# Patient Record
Sex: FEMALE | Race: WHITE
[De-identification: names, ages, dates, MRNs, and addresses within clinical notes are randomized per-mention and may not be internally consistent; named-entity substitution may affect disease eponyms.]

---

## 2018-01-17 ENCOUNTER — HOSPITAL ENCOUNTER (OUTPATIENT)
Dept: HOSPITAL 45 - C.RAD | Age: 58
Discharge: HOME | End: 2018-01-17
Attending: NURSE PRACTITIONER
Payer: COMMERCIAL

## 2018-01-17 DIAGNOSIS — N20.0: Primary | ICD-10-CM

## 2018-01-17 DIAGNOSIS — R10.9: ICD-10-CM

## 2018-01-17 LAB
BASOPHILS # BLD: 0.01 K/UL (ref 0–0.2)
BASOPHILS NFR BLD: 0.1 %
BUN SERPL-MCNC: 13 MG/DL (ref 7–18)
CALCIUM SERPL-MCNC: 9.8 MG/DL (ref 8.5–10.1)
CO2 SERPL-SCNC: 27 MMOL/L (ref 21–32)
CREAT SERPL-MCNC: 0.52 MG/DL (ref 0.6–1.2)
EOS ABS #: 0.05 K/UL (ref 0–0.5)
EOSINOPHIL NFR BLD AUTO: 198 K/UL (ref 130–400)
GLUCOSE SERPL-MCNC: 113 MG/DL (ref 70–99)
HCT VFR BLD CALC: 39.3 % (ref 37–47)
HGB BLD-MCNC: 13.4 G/DL (ref 12–16)
IG#: 0.02 K/UL (ref 0–0.02)
IMM GRANULOCYTES NFR BLD AUTO: 13.4 %
LYMPHOCYTES # BLD: 0.94 K/UL (ref 1.2–3.4)
MCH RBC QN AUTO: 28.9 PG (ref 25–34)
MCHC RBC AUTO-ENTMCNC: 34.1 G/DL (ref 32–36)
MCV RBC AUTO: 84.7 FL (ref 80–100)
MONO ABS #: 0.4 K/UL (ref 0.11–0.59)
MONOCYTES NFR BLD: 5.7 %
NEUT ABS #: 5.61 K/UL (ref 1.4–6.5)
NEUTROPHILS # BLD AUTO: 0.7 %
NEUTROPHILS NFR BLD AUTO: 79.8 %
PMV BLD AUTO: 9.1 FL (ref 7.4–10.4)
POTASSIUM SERPL-SCNC: 4.1 MMOL/L (ref 3.5–5.1)
RED CELL DISTRIBUTION WIDTH CV: 12.6 % (ref 11.5–14.5)
RED CELL DISTRIBUTION WIDTH SD: 38.8 FL (ref 36.4–46.3)
SODIUM SERPL-SCNC: 142 MMOL/L (ref 136–145)
WBC # BLD AUTO: 7.03 K/UL (ref 4.8–10.8)

## 2018-01-17 NOTE — DIAGNOSTIC IMAGING REPORT
CHEST 2 VIEWS ROUTINE



CLINICAL HISTORY: Preoperative evaluation. Nephrolithiasis.    



COMPARISON STUDY:  Chest radiograph December 8, 2013.



FINDINGS: Lung volumes are normal. Mild bibasilar opacities suggest atelectasis.

There is no consolidation to suggest pneumonia. There is no evidence for

pulmonary edema. Mild cardiomegaly is noted.



IMPRESSION:  



1. No acute cardiopulmonary findings.



2. Mild cardiomegaly. 









Electronically signed by:  Brett Mendez M.D.

1/17/2018 1:53 PM



Dictated Date/Time:  1/17/2018 1:51 PM

## 2018-01-17 NOTE — DIAGNOSTIC IMAGING REPORT
KUB



CLINICAL HISTORY: Nephrolithiasis. Right-sided pain.    



COMPARISON STUDY:  KUB December 15, 2017 and CT of the abdomen and pelvis

December 7, 2017. 



FINDINGS: Pelvic calcifications are unchanged and likely reflect phleboliths. No

renal calculi are noted, including a 6 mm calculus within the upper pole of the

left kidney. A 7 mm calcification projects over the left renal pelvis. Bowel gas

pattern is normal. Calcifications along the left lateral aspect of the lower

lumbar spine shown to represent phleboliths on prior CT.



IMPRESSION:  



1. Bilateral nephrolithiasis.  Suspected 7 mm left renal pelvis calculus.



2. No right ureteral calculi identified.







Electronically signed by:  Brett Mendez M.D.

1/17/2018 11:26 AM



Dictated Date/Time:  1/17/2018 11:21 AM

## 2018-01-18 ENCOUNTER — HOSPITAL ENCOUNTER (OUTPATIENT)
Dept: HOSPITAL 45 - C.RAD | Age: 58
Discharge: HOME | End: 2018-01-18
Attending: UROLOGY
Payer: COMMERCIAL

## 2018-01-18 DIAGNOSIS — N20.0: Primary | ICD-10-CM

## 2018-01-18 DIAGNOSIS — N20.1: ICD-10-CM

## 2018-01-18 NOTE — DIAGNOSTIC IMAGING REPORT
IVP W/OR W/O TOMOGRAMS



CLINICAL HISTORY: N20.0 Nephrolithiasis no latex allergy, no iodine allergy, not

di   



COMPARISON STUDY:  KUB 1/17/2018.



FINDINGS:  image again demonstrates bilateral renal calculi with the

largest in the upper pole of the left kidney measuring 5 mm. Multiple stable

calcifications in the deep pelvis consistent with phleboliths. There is also

stable phlebolith overlying the left L5 transverse process. There is an

irregular 8 mm stone within the left ureteropelvic junction which is new from

the prior studies. However, this does not result significant obstruction. There

is no hydronephrosis. The right ureter appears to be normal in course and

caliber. No additional filling defects identified within the renal collecting

systems. The bladder appears unremarkable. Mild pelvic floor collapse.



IMPRESSION:  

1. An irregular 8 mm nonobstructing stone within the left ureteropelvic junction

which is new from the prior studies. No associated hydronephrosis. 

2. Bilateral nephrolithiasis.

3. Mild pelvic floor collapse.





 







Electronically signed by:  Joel Siegel M.D.

1/18/2018 3:35 PM



Dictated Date/Time:  1/18/2018 3:31 PM

## 2018-01-19 ENCOUNTER — HOSPITAL ENCOUNTER (OUTPATIENT)
Dept: HOSPITAL 45 - X.SURG | Age: 58
Discharge: HOME | End: 2018-01-19
Attending: UROLOGY
Payer: COMMERCIAL

## 2018-01-19 VITALS
WEIGHT: 160.34 LBS | HEIGHT: 65 IN | WEIGHT: 160.34 LBS | BODY MASS INDEX: 26.71 KG/M2 | BODY MASS INDEX: 26.71 KG/M2 | HEIGHT: 65 IN

## 2018-01-19 VITALS
SYSTOLIC BLOOD PRESSURE: 151 MMHG | DIASTOLIC BLOOD PRESSURE: 83 MMHG | HEART RATE: 54 BPM | TEMPERATURE: 97.88 F | OXYGEN SATURATION: 95 %

## 2018-01-19 DIAGNOSIS — Z87.440: ICD-10-CM

## 2018-01-19 DIAGNOSIS — N20.0: Primary | ICD-10-CM

## 2018-01-19 DIAGNOSIS — N39.41: ICD-10-CM

## 2018-01-19 DIAGNOSIS — Z87.891: ICD-10-CM

## 2018-01-19 DIAGNOSIS — Z53.09: ICD-10-CM

## 2018-01-19 DIAGNOSIS — Z83.3: ICD-10-CM

## 2018-01-19 DIAGNOSIS — Z86.32: ICD-10-CM

## 2018-01-19 DIAGNOSIS — Z87.442: ICD-10-CM

## 2018-01-19 DIAGNOSIS — Z82.49: ICD-10-CM

## 2018-01-19 DIAGNOSIS — Z80.0: ICD-10-CM

## 2018-01-19 DIAGNOSIS — Z80.42: ICD-10-CM

## 2018-01-19 DIAGNOSIS — F32.9: ICD-10-CM

## 2018-01-19 DIAGNOSIS — E78.5: ICD-10-CM

## 2018-01-22 NOTE — EDITING REQUIRED CODING QUERY
CODING CLARIFICATION





Please provide a diagnosis/reason below for the reason of the cancelled procedure:







PROCEDURE CANCELLED DUE TO: ___________NSAID / antiplatelet use__________________







Thank you for your assistance,

Callie Manzano -

## 2018-01-26 ENCOUNTER — HOSPITAL ENCOUNTER (OUTPATIENT)
Dept: HOSPITAL 45 - X.SURG | Age: 58
Discharge: HOME | End: 2018-01-26
Attending: UROLOGY
Payer: COMMERCIAL

## 2018-01-26 VITALS
HEIGHT: 65 IN | HEIGHT: 65 IN | BODY MASS INDEX: 26.71 KG/M2 | WEIGHT: 160.34 LBS | BODY MASS INDEX: 26.71 KG/M2 | WEIGHT: 160.34 LBS

## 2018-01-26 VITALS — HEART RATE: 60 BPM | OXYGEN SATURATION: 98 % | SYSTOLIC BLOOD PRESSURE: 138 MMHG | DIASTOLIC BLOOD PRESSURE: 82 MMHG

## 2018-01-26 VITALS — TEMPERATURE: 97.52 F

## 2018-01-26 DIAGNOSIS — Z98.890: ICD-10-CM

## 2018-01-26 DIAGNOSIS — F32.9: ICD-10-CM

## 2018-01-26 DIAGNOSIS — Z80.0: ICD-10-CM

## 2018-01-26 DIAGNOSIS — N20.1: Primary | ICD-10-CM

## 2018-01-26 DIAGNOSIS — Z88.1: ICD-10-CM

## 2018-01-26 DIAGNOSIS — Z90.89: ICD-10-CM

## 2018-01-26 DIAGNOSIS — Z90.710: ICD-10-CM

## 2018-01-26 DIAGNOSIS — Z83.3: ICD-10-CM

## 2018-01-26 DIAGNOSIS — R39.9: ICD-10-CM

## 2018-01-26 DIAGNOSIS — Z82.49: ICD-10-CM

## 2018-01-26 DIAGNOSIS — E78.5: ICD-10-CM

## 2018-01-26 DIAGNOSIS — N39.41: ICD-10-CM

## 2018-01-26 DIAGNOSIS — N20.0: ICD-10-CM

## 2018-01-26 DIAGNOSIS — Z87.891: ICD-10-CM

## 2018-01-26 DIAGNOSIS — Z98.51: ICD-10-CM

## 2018-01-26 RX ADMIN — SODIUM CHLORIDE, SODIUM LACTATE, POTASSIUM CHLORIDE, AND CALCIUM CHLORIDE SCH MLS/HR: 600; 310; 30; 20 INJECTION, SOLUTION INTRAVENOUS at 10:16

## 2018-01-26 RX ADMIN — SODIUM CHLORIDE, SODIUM LACTATE, POTASSIUM CHLORIDE, AND CALCIUM CHLORIDE SCH MLS/HR: 600; 310; 30; 20 INJECTION, SOLUTION INTRAVENOUS at 07:40

## 2018-01-26 NOTE — [facility] OPERATIVE REPORT
Operative Report


Operative Date


Jan 26, 2018.





Pre-Operative Diagnosis





L Stone





Post-Operative Diagnosis





Same





Procedure(s) Performed





L ESWL





Surgeon


Rajinder





Assistant Surgeon(s)


None





Estimated Blood Loss


None





Findings


Left stone visualized on imaging.





Specimens





None





Anesthesia


General





Complication(s)


None





Disposition


Recovery Room / PACU





Indications


Symptomatic left stone.  Risks and benefits discussed at length.





Description of Procedure





Patient was consented and brought back to the operating room. Patient was 

placed under anesthesia in the supine position.  Patient was prepped and draped 

in the regular sterile fashion. A time out was completed.  





With the time out completed, The patient was assessed with fluoroscopy.  The 

stone was identified and position was triangulated.  At this point, the shock 

waves commenced. The stone was monitored throughout the process with 

fluoroscopy to assess progression and maintain position.  The stone was 

pulverized with 2500 shocks at a maximum voltage of 5 with a total fluoroscopic 

time of 3:06. 


The stone appeared to fragment, but unable to determine if completely 

pulverized. With the stone treated with maximum number of shocks, the procedure 

ended. 





The patient was cleaned, aroused from anesthesia, and transferred to the pacu 

in stable condition having tolerated the procedure well with no complications. 


I was present and participated in all aspects of the procedure. 





The patient will be monitored in the PACU until transferred.





Will plan for imaging as scheduled to determine success of treatment.


I attest to the content of the Intraoperative Record and any orders documented 

therein.  Any exceptions are noted below.

## 2018-01-26 NOTE — DISCHARGE INSTRUCTIONS
Discharge Instructions


Date of Service


Jan 26, 2018.





Admission


Reason for Admission:  Stones





Discharge


Discharge Diagnosis / Problem:  Stone





Discharge Goals


Goal(s):  Decrease discomfort, Improve function





Activity Recommendations


Activity Limitations:  resume your previous activity


Lifting Limitations:  gradually increase as tolerated


Exercise/Sports Limitations:  gradually increase as tolerated


Shower/Bathe:  no limitations





.





Instructions / Follow-Up


Instructions / Follow-Up


May have blood in urine. May have flank pain or bruising. May pass small 

fragments. Call if any issues.





Current Hospital Diet


Patient's current hospital diet:





Discharge Diet


Recommended Diet:  Regular Diet





Procedures


Procedures Performed:  


L Eswl





Pending Studies


Studies pending at discharge:  no





Medical Emergencies








.


Who to Call and When:





Medical Emergencies:  If at any time you feel your situation is an emergency, 

please call 911 immediately.





.





Non-Emergent Contact


Non-Emergency issues call your:  Primary Care Provider, Urologist


Call Non-Emergent contact if:  you have a fever, temperature is above 101, 

temperature is above 101.5, your pain is not controlled, your pain is worsening





.


.








"Provider Documentation" section prepared by Gilmer Calvillo,.








.





VTE Core Measure


Inpt VTE Proph given/why not?:  SCD's

## 2018-01-26 NOTE — ANESTHESIA PROGRESS NT - [facility]
Anesthesia Post Op Note


Date & Time


Jan 26, 2018 at 10:32





Vital Signs


Pain Intensity:  0





Vital Signs Past 12 Hours








  Date Time  Temp Pulse Resp B/P (MAP) Pulse Ox O2 Delivery O2 Flow Rate FiO2


 


1/26/18 10:21 36.7 63 14 127/77 96 Room Air  


 


1/26/18 10:21    127/77    


 


1/26/18 10:21    127/77    


 


1/26/18 10:20  66 14     


 


1/26/18 10:20  66 14     


 


1/26/18 10:20  64 14  95   


 


1/26/18 10:20  64 14  95   


 


1/26/18 10:16    139/80    


 


1/26/18 10:16    139/80    


 


1/26/18 10:15  65 14     


 


1/26/18 10:15  66 14  96   


 


1/26/18 10:15  65 14     


 


1/26/18 10:15  66 14  96   


 


1/26/18 10:11    126/71    


 


1/26/18 10:11    126/71    


 


1/26/18 10:10  59 14  100   


 


1/26/18 10:10  60 14     


 


1/26/18 10:10  60 14     


 


1/26/18 10:10  59 14  100   


 


1/26/18 10:06    122/69    


 


1/26/18 10:06    122/69    


 


1/26/18 10:05  60 15  99   


 


1/26/18 10:05  60 15     


 


1/26/18 10:05  60 15     


 


1/26/18 10:05  60 15  99   


 


1/26/18 10:01    126/70    


 


1/26/18 10:01    126/70    


 


1/26/18 10:00  60 14     


 


1/26/18 10:00  60 14  99   


 


1/26/18 10:00  60 14  99   


 


1/26/18 10:00  60 14     


 


1/26/18 09:56    135/73    


 


1/26/18 09:56 37.3 65 16 135/73 98 Mask 6 


 


1/26/18 09:56    135/73    


 


1/26/18 07:23 36.6 56 16 146/89 (108) 96 Room Air  











Notes


Mental Status:  alert / awake / arousable, participated in evaluation


Pt Amnestic to Procedure:  Yes


Nausea / Vomiting:  adequately controlled


Pain:  adequately controlled


Airway Patency, RR, SpO2:  stable & adequate


BP & HR:  stable & adequate


Hydration State:  stable & adequate


Anesthetic Complications:  no major complications apparent

## 2018-02-13 ENCOUNTER — HOSPITAL ENCOUNTER (OUTPATIENT)
Dept: HOSPITAL 45 - C.RAD | Age: 58
Discharge: HOME | End: 2018-02-13
Attending: UROLOGY
Payer: COMMERCIAL

## 2018-02-13 DIAGNOSIS — N20.0: Primary | ICD-10-CM

## 2018-02-13 NOTE — DIAGNOSTIC IMAGING REPORT
KUB



HISTORY: Follow-up study in a patient with nephrolithiasis  N20.0

[id number]



COMPARISON: IVP 1/18/2018, CT 12/7/2017, KUB 12/5/2017.



FINDINGS: The bowel gas pattern is non-obstructive. Moderate stool volume of the

cecum. There is no organomegaly.  Resolution of the previous noted left UPJ

calculus. Multiple left-sided renal calculi are again seen measuring up to 5 mm

the superior pole left kidney. Right renal shadow is partially obscured by bowel

gas. Nephrolithiasis of the superior pole right kidney redemonstrated. No

definite ureteral calculi are identified. Punctate calcifications projecting

over the left L5 transverse process and upper sacrum are unchanged suggesting

vascular calcifications. Probable phleboliths of the pelvis. No pneumoperitoneum

or pneumatosis. No fracture.



IMPRESSION:  



1. Unchanged bilateral nephrolithiasis, left greater than right without ureteral

calculi identified.

2. The previously noted left ureteropelvic junction calculus is not identified.







Electronically signed by:  Darin Freeman M.D.

2/13/2018 6:22 PM



Dictated Date/Time:  2/13/2018 6:19 PM